# Patient Record
Sex: MALE | Race: WHITE | NOT HISPANIC OR LATINO | URBAN - METROPOLITAN AREA
[De-identification: names, ages, dates, MRNs, and addresses within clinical notes are randomized per-mention and may not be internally consistent; named-entity substitution may affect disease eponyms.]

---

## 2023-08-11 ENCOUNTER — OFFICE VISIT (OUTPATIENT)
Dept: PRIMARY CARE | Facility: CLINIC | Age: 23
End: 2023-08-11
Payer: COMMERCIAL

## 2023-08-11 VITALS — DIASTOLIC BLOOD PRESSURE: 71 MMHG | SYSTOLIC BLOOD PRESSURE: 119 MMHG | WEIGHT: 163 LBS

## 2023-08-11 DIAGNOSIS — K51.90 ULCERATIVE COLITIS WITHOUT COMPLICATIONS, UNSPECIFIED LOCATION (MULTI): ICD-10-CM

## 2023-08-11 DIAGNOSIS — Z00.00 HEALTH CARE MAINTENANCE: Primary | ICD-10-CM

## 2023-08-11 LAB
ALANINE AMINOTRANSFERASE (SGPT) (U/L) IN SER/PLAS: 36 U/L (ref 10–52)
ALBUMIN (G/DL) IN SER/PLAS: 3.5 G/DL (ref 3.4–5)
ALKALINE PHOSPHATASE (U/L) IN SER/PLAS: 156 U/L (ref 33–120)
ANION GAP IN SER/PLAS: 9 MMOL/L (ref 10–20)
ASPARTATE AMINOTRANSFERASE (SGOT) (U/L) IN SER/PLAS: 21 U/L (ref 9–39)
BILIRUBIN TOTAL (MG/DL) IN SER/PLAS: 0.3 MG/DL (ref 0–1.2)
CALCIUM (MG/DL) IN SER/PLAS: 9 MG/DL (ref 8.6–10.6)
CARBON DIOXIDE, TOTAL (MMOL/L) IN SER/PLAS: 30 MMOL/L (ref 21–32)
CHLORIDE (MMOL/L) IN SER/PLAS: 105 MMOL/L (ref 98–107)
CREATININE (MG/DL) IN SER/PLAS: 0.93 MG/DL (ref 0.5–1.3)
ERYTHROCYTE DISTRIBUTION WIDTH (RATIO) BY AUTOMATED COUNT: 17.7 % (ref 11.5–14.5)
ERYTHROCYTE MEAN CORPUSCULAR HEMOGLOBIN CONCENTRATION (G/DL) BY AUTOMATED: 28.7 G/DL (ref 32–36)
ERYTHROCYTE MEAN CORPUSCULAR VOLUME (FL) BY AUTOMATED COUNT: 75 FL (ref 80–100)
ERYTHROCYTES (10*6/UL) IN BLOOD BY AUTOMATED COUNT: 5.03 X10E12/L (ref 4.5–5.9)
GFR MALE: >90 ML/MIN/1.73M2
GLUCOSE (MG/DL) IN SER/PLAS: 74 MG/DL (ref 74–99)
HEMATOCRIT (%) IN BLOOD BY AUTOMATED COUNT: 37.6 % (ref 41–52)
HEMOGLOBIN (G/DL) IN BLOOD: 10.8 G/DL (ref 13.5–17.5)
LEUKOCYTES (10*3/UL) IN BLOOD BY AUTOMATED COUNT: 7.3 X10E9/L (ref 4.4–11.3)
NRBC (PER 100 WBCS) BY AUTOMATED COUNT: 0 /100 WBC (ref 0–0)
PLATELETS (10*3/UL) IN BLOOD AUTOMATED COUNT: 592 X10E9/L (ref 150–450)
POTASSIUM (MMOL/L) IN SER/PLAS: 4.8 MMOL/L (ref 3.5–5.3)
PROTEIN TOTAL: 6.9 G/DL (ref 6.4–8.2)
SODIUM (MMOL/L) IN SER/PLAS: 139 MMOL/L (ref 136–145)
UREA NITROGEN (MG/DL) IN SER/PLAS: 10 MG/DL (ref 6–23)

## 2023-08-11 PROCEDURE — 85027 COMPLETE CBC AUTOMATED: CPT

## 2023-08-11 PROCEDURE — 99385 PREV VISIT NEW AGE 18-39: CPT | Performed by: INTERNAL MEDICINE

## 2023-08-11 PROCEDURE — 80053 COMPREHEN METABOLIC PANEL: CPT

## 2023-08-11 NOTE — PROGRESS NOTES
Subjective   Patient ID: Renny Fang is a 22 y.o. male who presents for No chief complaint on file..    HPI CPE see updated front sheet met patient for first time father is also a patient here had longstanding ulcerative colitis versus Crohn's disease as a youth was on Humira Remicade steroids but while in college moved over to just finding a good diet for himself has not had blood in the stool in quite some time weight has been maintained overall feels well and exercises regularly no chest pain no shortness of breath no none gastrointestinal UC issues    Past medical history ulcerative colitis    Medications none    Allergies no known drug allergies    Social history no tobacco no alcohol    Family history noted and unchanged    Prevention exercises regularly no recent blood work or colonoscopy    Review of Systems    Objective   There were no vitals taken for this visit.    Physical Exam vital signs noted alert and oriented x 3 NCAT slightest of pallor no icterus or no jaundice PERRLA EOMI nares without discharge OP benign TM normal bilateral EAC clear bilateral no AC nodes no JVD or bruit no thyromegaly chest clear to auscultation CV regular rate and rhythm S1-S2 abdomen soft flat nontender normal active bowel sounds LS spine normal curvature negative straight leg raise extremities no clubbing cyanosis or edema normal distal pulses DTR 2+    Assessment/Plan    impression General medical examination ulcerative colitis  Plan skin and joints appear to be normal check Chem-7 advised on glucose potassium and kidney function check hepatic panel advised on liver enzymes check CBC advised on blood count refer to gastroenterology requisition made he may in the future be moving as he is looking for a job near the [facility] and in other [facility] good diet regular exercise good water consumption avoidance of NSAIDs recheck based on above TT 50 cc 26

## 2024-01-11 ENCOUNTER — APPOINTMENT (OUTPATIENT)
Dept: GASTROENTEROLOGY | Facility: CLINIC | Age: 24
End: 2024-01-11
Payer: COMMERCIAL

## 2024-05-13 ENCOUNTER — LAB (OUTPATIENT)
Dept: LAB | Facility: LAB | Age: 24
End: 2024-05-13
Payer: COMMERCIAL

## 2024-05-13 ENCOUNTER — OFFICE VISIT (OUTPATIENT)
Dept: PRIMARY CARE | Facility: CLINIC | Age: 24
End: 2024-05-13
Payer: COMMERCIAL

## 2024-05-13 VITALS — RESPIRATION RATE: 12 BRPM | HEART RATE: 76 BPM

## 2024-05-13 DIAGNOSIS — K51.90 ULCERATIVE COLITIS WITHOUT COMPLICATIONS, UNSPECIFIED LOCATION (MULTI): ICD-10-CM

## 2024-05-13 DIAGNOSIS — M19.079 ANKLE ARTHROPATHY: ICD-10-CM

## 2024-05-13 DIAGNOSIS — K51.90 ULCERATIVE COLITIS WITHOUT COMPLICATIONS, UNSPECIFIED LOCATION (MULTI): Primary | ICD-10-CM

## 2024-05-13 LAB
ANION GAP SERPL CALC-SCNC: 11 MMOL/L (ref 10–20)
BUN SERPL-MCNC: 7 MG/DL (ref 6–23)
CALCIUM SERPL-MCNC: 8.7 MG/DL (ref 8.6–10.6)
CHLORIDE SERPL-SCNC: 104 MMOL/L (ref 98–107)
CO2 SERPL-SCNC: 28 MMOL/L (ref 21–32)
CREAT SERPL-MCNC: 0.91 MG/DL (ref 0.5–1.3)
EGFRCR SERPLBLD CKD-EPI 2021: >90 ML/MIN/1.73M*2
ERYTHROCYTE [DISTWIDTH] IN BLOOD BY AUTOMATED COUNT: 17.3 % (ref 11.5–14.5)
ERYTHROCYTE [SEDIMENTATION RATE] IN BLOOD BY WESTERGREN METHOD: 33 MM/H (ref 0–15)
GLUCOSE SERPL-MCNC: 69 MG/DL (ref 74–99)
HCT VFR BLD AUTO: 29.5 % (ref 41–52)
HGB BLD-MCNC: 7.8 G/DL (ref 13.5–17.5)
MCH RBC QN AUTO: 17.8 PG (ref 26–34)
MCHC RBC AUTO-ENTMCNC: 26.4 G/DL (ref 32–36)
MCV RBC AUTO: 67 FL (ref 80–100)
NRBC BLD-RTO: 0 /100 WBCS (ref 0–0)
PLATELET # BLD AUTO: 699 X10*3/UL (ref 150–450)
POTASSIUM SERPL-SCNC: 5 MMOL/L (ref 3.5–5.3)
RBC # BLD AUTO: 4.39 X10*6/UL (ref 4.5–5.9)
SODIUM SERPL-SCNC: 138 MMOL/L (ref 136–145)
WBC # BLD AUTO: 5.9 X10*3/UL (ref 4.4–11.3)

## 2024-05-13 PROCEDURE — 36415 COLL VENOUS BLD VENIPUNCTURE: CPT

## 2024-05-13 PROCEDURE — 99213 OFFICE O/P EST LOW 20 MIN: CPT | Performed by: INTERNAL MEDICINE

## 2024-05-13 PROCEDURE — 80048 BASIC METABOLIC PNL TOTAL CA: CPT

## 2024-05-13 PROCEDURE — 85027 COMPLETE CBC AUTOMATED: CPT

## 2024-05-13 PROCEDURE — 85652 RBC SED RATE AUTOMATED: CPT

## 2024-05-13 RX ORDER — PREDNISONE 20 MG/1
TABLET ORAL
Qty: 15 TABLET | Refills: 0 | Status: SHIPPED | OUTPATIENT
Start: 2024-05-13

## 2024-05-13 NOTE — PROGRESS NOTES
Subjective   Patient ID: Renny Fang is a 23 y.o. male who presents for No chief complaint on file..    HPI eac     Follow-up visit and sick visit no chest pain no shortness of breath has had bilateral ankle swelling separately over the past 1 to 2 weeks no trauma injury change in diet bowels have been okay he has not had follow-up with his gastroenterologist regarding his ulcerative colitis no fever vital signs noted alert and oriented x 3 NCAT    Review of Systems    Objective   Pulse 76   Resp 12   Pallor?  No icterus no JVD chest clear to auscultation CV regular rate and rhythm S1-S2 abdomen soft nontender normal active bowel sounds extremities minimal posterior right lateral malleolus swelling with minimal pitting edema along the side of the foot there the left side is now normal there is no warmth okay range of motion  Physical Exam    Assessment/Plan    impression ulcerative colitis ankle arthropathy  Plan check ESR advised on inflammatory marker check CBC advised on blood count check Chem-7 advised on glucose potassium and kidney function follow-up with gastroenterology advised on the need for short-term steroids?  Has had no bleeding from his bowels and overall feels well except for the ankles    Refer to gastroenterology if he has not done so known call back same day (check)

## 2024-06-04 ENCOUNTER — TELEPHONE (OUTPATIENT)
Dept: GASTROENTEROLOGY | Facility: HOSPITAL | Age: 24
End: 2024-06-04
Payer: COMMERCIAL

## 2024-06-28 ENCOUNTER — OFFICE VISIT (OUTPATIENT)
Dept: PRIMARY CARE | Facility: CLINIC | Age: 24
End: 2024-06-28
Payer: COMMERCIAL

## 2024-06-28 VITALS — WEIGHT: 161 LBS | DIASTOLIC BLOOD PRESSURE: 72 MMHG | SYSTOLIC BLOOD PRESSURE: 129 MMHG

## 2024-06-28 DIAGNOSIS — K51.90 ULCERATIVE COLITIS WITHOUT COMPLICATIONS, UNSPECIFIED LOCATION (MULTI): Primary | ICD-10-CM

## 2024-06-28 DIAGNOSIS — D64.9 ANEMIA, UNSPECIFIED TYPE: ICD-10-CM

## 2024-06-28 PROCEDURE — 99213 OFFICE O/P EST LOW 20 MIN: CPT | Performed by: INTERNAL MEDICINE

## 2024-06-28 NOTE — PROGRESS NOTES
Subjective   Patient ID: Renny Fang is a 23 y.o. male who presents for No chief complaint on file..    HPI check up  Nausea having diarrhea  No chest pain or shortness of breath no blood in stool prednisone definitely helped previously prior blood work reviewed no fever  Review of Systems    Objective   There were no vitals taken for this visit.    Physical Exam vital signs noted alert and oriented x 3 NCAT pallor no JVD chest clear to auscultation CV regular rate rhythm S1-S2 abdomen soft nontender normal active bowel sounds extremities no clubbing cyanosis or edema normal distal pulses    Assessment/Plan    impression inflammatory bowel disease anemia  Plan set up for colonoscopy (req made) set up with GI (req made) discussed with diet as it relates did not want Zofran for nausea as yet follow-up based on above proceedings

## 2024-07-02 DIAGNOSIS — Z12.11 COLON CANCER SCREENING: ICD-10-CM

## 2024-07-02 RX ORDER — POLYETHYLENE GLYCOL 3350, SODIUM CHLORIDE, SODIUM BICARBONATE, POTASSIUM CHLORIDE 420; 11.2; 5.72; 1.48 G/4L; G/4L; G/4L; G/4L
POWDER, FOR SOLUTION ORAL
Qty: 4000 ML | Refills: 0 | Status: SHIPPED | OUTPATIENT
Start: 2024-07-02

## 2024-08-06 ENCOUNTER — LAB REQUISITION (OUTPATIENT)
Dept: LAB | Facility: HOSPITAL | Age: 24
End: 2024-08-06
Payer: COMMERCIAL

## 2024-08-06 ENCOUNTER — APPOINTMENT (OUTPATIENT)
Dept: GASTROENTEROLOGY | Facility: EXTERNAL LOCATION | Age: 24
End: 2024-08-06
Payer: COMMERCIAL

## 2024-08-06 DIAGNOSIS — K52.9 INFLAMMATORY BOWEL DISEASE: ICD-10-CM

## 2024-08-06 DIAGNOSIS — K51.90 ULCERATIVE COLITIS WITHOUT COMPLICATIONS, UNSPECIFIED LOCATION (MULTI): Primary | ICD-10-CM

## 2024-08-06 DIAGNOSIS — K51.90 ULCERATIVE COLITIS WITHOUT COMPLICATIONS, UNSPECIFIED LOCATION (MULTI): ICD-10-CM

## 2024-08-06 DIAGNOSIS — K50.111 CROHN'S DISEASE OF COLON WITH RECTAL BLEEDING (MULTI): Primary | ICD-10-CM

## 2024-08-06 DIAGNOSIS — D49.0: ICD-10-CM

## 2024-08-06 DIAGNOSIS — K92.1 BLOOD IN STOOL: ICD-10-CM

## 2024-08-06 DIAGNOSIS — D50.0 IRON DEFICIENCY ANEMIA DUE TO CHRONIC BLOOD LOSS: Primary | ICD-10-CM

## 2024-08-06 DIAGNOSIS — K51.40 PSEUDOPOLYPOSIS OF COLON WITHOUT COMPLICATION, UNSPECIFIED PART OF COLON (MULTI): ICD-10-CM

## 2024-08-06 DIAGNOSIS — D64.9 ANEMIA, UNSPECIFIED TYPE: ICD-10-CM

## 2024-08-06 DIAGNOSIS — K50.811 CROHN'S DISEASE OF BOTH SMALL AND LARGE INTESTINE WITH RECTAL BLEEDING (MULTI): ICD-10-CM

## 2024-08-06 DIAGNOSIS — R10.30 LOWER ABDOMINAL PAIN: ICD-10-CM

## 2024-08-06 PROCEDURE — 45380 COLONOSCOPY AND BIOPSY: CPT | Performed by: INTERNAL MEDICINE

## 2024-08-06 PROCEDURE — 88305 TISSUE EXAM BY PATHOLOGIST: CPT

## 2024-08-06 RX ORDER — PREDNISONE 10 MG/1
TABLET ORAL
Qty: 252 TABLET | Refills: 0 | Status: SHIPPED | OUTPATIENT
Start: 2024-08-06 | End: 2024-11-26

## 2024-08-07 ENCOUNTER — TELEPHONE (OUTPATIENT)
Dept: GASTROENTEROLOGY | Facility: HOSPITAL | Age: 24
End: 2024-08-07
Payer: COMMERCIAL

## 2024-08-08 ENCOUNTER — LAB (OUTPATIENT)
Dept: LAB | Facility: LAB | Age: 24
End: 2024-08-08
Payer: COMMERCIAL

## 2024-08-08 DIAGNOSIS — D50.0 ANEMIA DUE TO CHRONIC BLOOD LOSS: ICD-10-CM

## 2024-08-08 DIAGNOSIS — K51.90 ULCERATIVE COLITIS WITHOUT COMPLICATIONS, UNSPECIFIED LOCATION (MULTI): ICD-10-CM

## 2024-08-08 LAB
ALBUMIN SERPL BCP-MCNC: 3.2 G/DL (ref 3.4–5)
ALP SERPL-CCNC: 129 U/L (ref 33–120)
ALT SERPL W P-5'-P-CCNC: 12 U/L (ref 10–52)
AST SERPL W P-5'-P-CCNC: 13 U/L (ref 9–39)
BASOPHILS # BLD AUTO: 0.04 X10*3/UL (ref 0–0.1)
BASOPHILS NFR BLD AUTO: 0.5 %
BILIRUB DIRECT SERPL-MCNC: 0 MG/DL (ref 0–0.3)
BILIRUB SERPL-MCNC: 0.2 MG/DL (ref 0–1.2)
CRP SERPL-MCNC: 2.43 MG/DL
EOSINOPHIL # BLD AUTO: 0.01 X10*3/UL (ref 0–0.7)
EOSINOPHIL NFR BLD AUTO: 0.1 %
ERYTHROCYTE [DISTWIDTH] IN BLOOD BY AUTOMATED COUNT: 17.7 % (ref 11.5–14.5)
HBV CORE AB SER QL: NONREACTIVE
HBV SURFACE AB SER-ACNC: 11.4 MIU/ML
HBV SURFACE AG SERPL QL IA: NONREACTIVE
HCT VFR BLD AUTO: 30.1 % (ref 41–52)
HGB BLD-MCNC: 7.5 G/DL (ref 13.5–17.5)
IMM GRANULOCYTES # BLD AUTO: 0.03 X10*3/UL (ref 0–0.7)
IMM GRANULOCYTES NFR BLD AUTO: 0.4 % (ref 0–0.9)
LYMPHOCYTES # BLD AUTO: 1.81 X10*3/UL (ref 1.2–4.8)
LYMPHOCYTES NFR BLD AUTO: 22.9 %
MCH RBC QN AUTO: 16.9 PG (ref 26–34)
MCHC RBC AUTO-ENTMCNC: 24.9 G/DL (ref 32–36)
MCV RBC AUTO: 68 FL (ref 80–100)
MONOCYTES # BLD AUTO: 1.22 X10*3/UL (ref 0.1–1)
MONOCYTES NFR BLD AUTO: 15.4 %
NEUTROPHILS # BLD AUTO: 4.79 X10*3/UL (ref 1.2–7.7)
NEUTROPHILS NFR BLD AUTO: 60.7 %
NRBC BLD-RTO: 0 /100 WBCS (ref 0–0)
PLATELET # BLD AUTO: 838 X10*3/UL (ref 150–450)
PROT SERPL-MCNC: 6.8 G/DL (ref 6.4–8.2)
RBC # BLD AUTO: 4.44 X10*6/UL (ref 4.5–5.9)
WBC # BLD AUTO: 7.9 X10*3/UL (ref 4.4–11.3)

## 2024-08-08 PROCEDURE — 87340 HEPATITIS B SURFACE AG IA: CPT

## 2024-08-08 PROCEDURE — 84075 ASSAY ALKALINE PHOSPHATASE: CPT

## 2024-08-08 PROCEDURE — 36415 COLL VENOUS BLD VENIPUNCTURE: CPT

## 2024-08-08 PROCEDURE — 86704 HEP B CORE ANTIBODY TOTAL: CPT

## 2024-08-08 PROCEDURE — 84450 TRANSFERASE (AST) (SGOT): CPT

## 2024-08-08 PROCEDURE — 82247 BILIRUBIN TOTAL: CPT

## 2024-08-08 PROCEDURE — 84155 ASSAY OF PROTEIN SERUM: CPT

## 2024-08-08 PROCEDURE — 82728 ASSAY OF FERRITIN: CPT

## 2024-08-08 PROCEDURE — 86481 TB AG RESPONSE T-CELL SUSP: CPT

## 2024-08-08 PROCEDURE — 83540 ASSAY OF IRON: CPT

## 2024-08-08 PROCEDURE — 85025 COMPLETE CBC W/AUTO DIFF WBC: CPT

## 2024-08-08 PROCEDURE — 84460 ALANINE AMINO (ALT) (SGPT): CPT

## 2024-08-08 PROCEDURE — 83550 IRON BINDING TEST: CPT

## 2024-08-08 PROCEDURE — 86140 C-REACTIVE PROTEIN: CPT

## 2024-08-08 PROCEDURE — 82040 ASSAY OF SERUM ALBUMIN: CPT

## 2024-08-08 PROCEDURE — 86706 HEP B SURFACE ANTIBODY: CPT

## 2024-08-09 ENCOUNTER — LAB (OUTPATIENT)
Dept: LAB | Facility: LAB | Age: 24
End: 2024-08-09
Payer: COMMERCIAL

## 2024-08-09 DIAGNOSIS — K51.90 ULCERATIVE COLITIS WITHOUT COMPLICATIONS, UNSPECIFIED LOCATION (MULTI): ICD-10-CM

## 2024-08-09 DIAGNOSIS — D50.0 ANEMIA DUE TO CHRONIC BLOOD LOSS: Primary | ICD-10-CM

## 2024-08-09 LAB
FERRITIN SERPL-MCNC: 9 NG/ML (ref 20–300)
IRON SATN MFR SERPL: 3 % (ref 25–45)
IRON SERPL-MCNC: 10 UG/DL (ref 35–150)
TIBC SERPL-MCNC: 344 UG/DL (ref 240–445)
UIBC SERPL-MCNC: 334 UG/DL (ref 110–370)

## 2024-08-09 PROCEDURE — 83993 ASSAY FOR CALPROTECTIN FECAL: CPT

## 2024-08-10 LAB
NIL(NEG) CONTROL SPOT COUNT: NORMAL
PANEL A SPOT COUNT: 0
PANEL B SPOT COUNT: 0
POS CONTROL SPOT COUNT: NORMAL
T-SPOT. TB INTERPRETATION: NEGATIVE

## 2024-08-12 NOTE — PROGRESS NOTES
Gastroenterology Clinic Consult Note    Reason For Consult  IBD ileocolitis    History Of Present Illness  Renny Fang is a 23 y.o. male with a past medical history of ileocolonic IBD (previously diagnosed in 2011, now suspicious for Crohn's) and PSC who presents to GI clinic for consultation.     He was initially diagnosed with ulcerative pancolitis in 2011. He was admitted to the hospital after his index colonoscopy for IV solumedrol. He had elevated bilirubin so had liver biopsy during this admission in 2011 which was consistent with PSC.  Was started on actigall and was treated for cholangitis with IV steroids during this hospitalization as well. Previously been on Methotrexate, Sulfasalazine, Imuran (had allergic reaction). He was on Humira and Mesalamine in 2017 when he moved to [place] for college.    In [place], he reports stopping Humira because of antibodies, and was started on Remicade for about a year but felt it was ineffective ([id number]) and stopped therapy all together due to inconvenience of appointments (no car). Recently moved back to [place] to live and work and has been off all therapy for the last 5 years. He has been quite symptomatic for many years and is now living in [place] with his parents to focus on his health issues.    He had 8/6/2024 colonoscopy at [facility] by Dr. Golden which showed severe pancolitis and ileitis. He had mild narrowing in the rectum and a 15mm polypoid lesion was resected at the transverse colon. Pathology from this procedure is still pending. Fecal calprotectin is pending, CRP is mildly elevated at 2.43 and patient has iron deficiency anemia with hgb 7 and ferritin of 9. Started on prednisone 40mg once daily. Bought OTC iron supplements which he has been taking once daily 60mg     Prior to prednisone, he was having, urgency with frequency, having Bms 15-20 times per day, infrequent and scant blood in BM. Noctural stools up to 4-5x per night  some nights. Occasional accidents. Over the last year, has had waves of nausea with intense salivation a couple times a week. Abdominal discomfort that is intermittent. No skin rashes, no eye inflammation, no oral ulcers. Had some alternating left and right ankle swelling and pain over the last 4-5 months that went away when starting Prednisone. Has been more fatigued and with low energy. Has been told he has looked more pale in the last few months. Denies perianal drainage/abscess. He has lost about 10-15lbs in the last 1 year.    He is tapering prednisone by 5mg weekly, goes down to 35mg once daily tomorrow. He is only having 3-5 Bms since starting prednisone, less urgency and frequency. Feels better       Past Medical History  Past Medical History:   Diagnosis Date    Personal history of other diseases of the digestive system     Personal history of inflammatory bowel disease    Primary sclerosing cholangitis (CMS-HCC)     Primary sclerosing cholangitis       Surgical History  Past Surgical History:   Procedure Laterality Date    COLONOSCOPY  10/01/2014    Complete Colonoscopy       Social History  Social Determinants of Health     Tobacco Use: Low Risk  (8/13/2024)    Patient History     Smoking Tobacco Use: Never     Smokeless Tobacco Use: Never     Passive Exposure: Not on file   Alcohol Use: Not on file   Financial Resource Strain: Not on file   Food Insecurity: Not on file   Transportation Needs: Not on file   Physical Activity: Not on file   Stress: Not on file   Social Connections: Not on file   Intimate Partner Violence: Not on file   Depression: Not on file   Housing Stability: Not on file   Utilities: Not on file   Digital Equity: Not on file   Health Literacy: Not on file       Family History  No family history on file.   Grandmother - has ostomy, but not sure why    Allergies  No Known Allergies    Home Medications    Current Outpatient Medications:     predniSONE (Deltasone) 10 mg tablet, Take 4  "tablets (40 mg) by mouth once daily for 14 days, THEN 3.5 tablets (35 mg) once daily for 14 days, THEN 3 tablets (30 mg) once daily for 14 days, THEN 2.5 tablets (25 mg) once daily for 14 days, THEN 2 tablets (20 mg) once daily for 14 days, THEN 1.5 tablets (15 mg) once daily for 14 days, THEN 1 tablet (10 mg) once daily for 14 days, THEN 0.5 tablets (5 mg) once daily for 14 days. Take by mouth as directed., Disp: 252 tablet, Rfl: 0    polyethylene glycol-electrolytes (Nulytely) 420 gram solution, Drink 1/2 starting at 6 pm the night before your procedure then drink the 2nd 1/2 5 hours before procedure arrival time (Patient not taking: Reported on 8/13/2024), Disp: 4000 mL, Rfl: 0    Review of Systems  See HPI above     Physical Exam  General: thin, pale-appearing, no acute distress  HEENT: PERRLA, EOM intact, no scleral icterus, moist MM  Respiratory: CTA bilaterally, normal work of breathing  Cardiovascular: RRR, no murmurs/rubs/gallops  Abdomen: Soft, nontender, nondistended, bowel sounds present, no masses palpated, no organomeagly  Extremities: no edema, no asterixis  Neuro: alert and oriented, CNII-XII grossly intact, moves all 4 extremities with no focal deficits     Last Recorded Vitals  Blood pressure 121/72, pulse 81, temperature 36.5 °C (97.7 °F), height 1.778 m (5' 10\"), weight 72.1 kg (159 lb), SpO2 98%.    General: pale-appearing, no acute distress  HEENT: PERRLA, EOM intact, no scleral icterus, moist MM  Respiratory: CTA bilaterally, normal work of breathing  Cardiovascular: RRR, no murmurs/rubs/gallops  Abdomen: Soft, nontender, nondistended  Extremities: no edema, no asterixis  Neuro: alert and oriented, CNII-XII grossly intact, moves all 4 extremities with no focal deficits        Relevant Results    Current Outpatient Medications:     predniSONE (Deltasone) 10 mg tablet, Take 4 tablets (40 mg) by mouth once daily for 14 days, THEN 3.5 tablets (35 mg) once daily for 14 days, THEN 3 tablets (30 mg) " once daily for 14 days, THEN 2.5 tablets (25 mg) once daily for 14 days, THEN 2 tablets (20 mg) once daily for 14 days, THEN 1.5 tablets (15 mg) once daily for 14 days, THEN 1 tablet (10 mg) once daily for 14 days, THEN 0.5 tablets (5 mg) once daily for 14 days. Take by mouth as directed., Disp: 252 tablet, Rfl: 0    polyethylene glycol-electrolytes (Nulytely) 420 gram solution, Drink 1/2 starting at 6 pm the night before your procedure then drink the 2nd 1/2 5 hours before procedure arrival time (Patient not taking: Reported on 8/13/2024), Disp: 4000 mL, Rfl: 0     Lab Results   Component Value Date    WBC 7.9 08/08/2024    HGB 7.5 (L) 08/08/2024    HCT 30.1 (L) 08/08/2024    MCV 68 (L) 08/08/2024     (H) 08/08/2024     Lab Results   Component Value Date    GLUCOSE 69 (L) 05/13/2024    CALCIUM 8.7 05/13/2024     05/13/2024    K 5.0 05/13/2024    CO2 28 05/13/2024     05/13/2024    BUN 7 05/13/2024    CREATININE 0.91 05/13/2024     Lab Results   Component Value Date    ALT 12 08/08/2024    AST 13 08/08/2024    ALKPHOS 129 (H) 08/08/2024    BILITOT 0.2 08/08/2024       Imaging:    MRCP 2015  IMPRESSION:     1.  Redemonstration of mild biliary ductal dilatation dilatation   involving the intrahepatic biliary ducts to the subcapsular   periphery. Associated diffuse irregularity with characteristic beaded   appearance suggestive of primary sclerosing cholangitis.  The degree   of dilatation and its extent to the periphery are grossly unchanged   when compared to prior scan in 2011.  The degree of irregularity of   individual ducts in the periphery has slightly worsened in the   interval.  2.  No high grade strictures in the CBD, common hepatic and central   right and left hepatic ducts.   3.  Normal appearance of the liver and the remainder of the upper   abdomen on this scan.    Procedures:    2011 Liver biopsy    FINAL DIAGNOSIS  LIVER, NEEDLE BIOPSY:  --ACUTE DESTRUCTIVE CHOLANGITIS WITH FOCAL BILE  DUCT EFFACEMENT AND FIBROSIS  CONSISTENT WITH PRIMARY SCLEROSING CHOLANGITIS.  --SUPERIMPOSED SUPPURATIVE CHANGES IN SMALL BILE DUCTS CONSISTENT WITH  ASCENDING CHOLANGITIS.  --SINGLE FOCUS OF HISTIOCYTIC FOAM CELL ACCUMULATION CONSISTENT WITH DUCTAL  RUPTURE. SEE NOTE.      ASSESSMENT/PLAN:    #IBD ileocolitis - previously diagnosed with pan-UC but has ileitis on recent colonoscopy, pathology from which is still pending. Suspect this is likely Crohn's disease. He has been off therapy for the last 5 years and has uncontrolled disease at this time and needs advanced therapy. Has previously been on Methotrexate, Imuran (allergic reaction), Sulfasalazine, Melsalamine, and also failed both Humira (Antibodies) and Remicade (ineffective) so would recommend Rinvoq. Continue Prednisone taper in the meantime while awaiting insurance approval. In anticipation, will give 1st baron shingrix vaccine in clinic today, 2nd dose will be due within 2-6 months.    #Iron deficiency anemia -hgb 7.5, ferritin 9. Likely related to uncontrolled disease. Start iron supplementation    #Elevated alkaline phosphatase - likely related to PSC. Previously had mild intrahepatic biliary ductal dilation extending to subcapsular periphery on last MRCP in 2015. Not on any therapy. Overdue for repeat imaging, will obtain MR liver with and without    Plan:  -Continue Prednisone taper, lowering by 5mg every 7 days  -Plan to start Rinvoq 45mg once daily x3 months, then followed by 30mg once daily  -Start ferrous sulfate 325mg twice daily  -Use ppx Protonix 40mg once daily QAM for the next 2-3 months  -Obtain MRI Liver  -ShingRx vaccine (first dose) today.  Second dose will be in 2-6 months  -RTC 2 months      Shwetha Mccoy MD

## 2024-08-13 ENCOUNTER — OFFICE VISIT (OUTPATIENT)
Dept: GASTROENTEROLOGY | Facility: HOSPITAL | Age: 24
End: 2024-08-13
Payer: COMMERCIAL

## 2024-08-13 VITALS
HEIGHT: 70 IN | DIASTOLIC BLOOD PRESSURE: 72 MMHG | HEART RATE: 81 BPM | BODY MASS INDEX: 22.76 KG/M2 | TEMPERATURE: 97.7 F | SYSTOLIC BLOOD PRESSURE: 121 MMHG | OXYGEN SATURATION: 98 % | WEIGHT: 159 LBS

## 2024-08-13 DIAGNOSIS — K51.90 ULCERATIVE COLITIS WITHOUT COMPLICATIONS, UNSPECIFIED LOCATION (MULTI): ICD-10-CM

## 2024-08-13 DIAGNOSIS — K50.818 CROHN'S DISEASE OF BOTH SMALL AND LARGE INTESTINE WITH OTHER COMPLICATION (MULTI): Primary | ICD-10-CM

## 2024-08-13 DIAGNOSIS — D50.0 IRON DEFICIENCY ANEMIA DUE TO CHRONIC BLOOD LOSS: ICD-10-CM

## 2024-08-13 DIAGNOSIS — K83.01 PRIMARY SCLEROSING CHOLANGITIS (CMS-HCC): ICD-10-CM

## 2024-08-13 LAB
LABORATORY COMMENT REPORT: NORMAL
PATH REPORT.COMMENTS IMP SPEC: NORMAL
PATH REPORT.FINAL DX SPEC: NORMAL
PATH REPORT.GROSS SPEC: NORMAL
PATH REPORT.RELEVANT HX SPEC: NORMAL
PATH REPORT.TOTAL CANCER: NORMAL

## 2024-08-13 PROCEDURE — 99214 OFFICE O/P EST MOD 30 MIN: CPT | Performed by: INTERNAL MEDICINE

## 2024-08-13 PROCEDURE — 1036F TOBACCO NON-USER: CPT | Performed by: INTERNAL MEDICINE

## 2024-08-13 PROCEDURE — 90750 HZV VACC RECOMBINANT IM: CPT | Performed by: INTERNAL MEDICINE

## 2024-08-13 PROCEDURE — 3008F BODY MASS INDEX DOCD: CPT | Performed by: INTERNAL MEDICINE

## 2024-08-13 PROCEDURE — 99204 OFFICE O/P NEW MOD 45 MIN: CPT | Performed by: INTERNAL MEDICINE

## 2024-08-13 RX ORDER — FERROUS SULFATE 325(65) MG
650 TABLET, DELAYED RELEASE (ENTERIC COATED) ORAL
Qty: 60 TABLET | Refills: 11 | Status: SHIPPED | OUTPATIENT
Start: 2024-08-13 | End: 2025-08-13

## 2024-08-13 RX ORDER — PANTOPRAZOLE SODIUM 40 MG/1
40 TABLET, DELAYED RELEASE ORAL DAILY
Qty: 30 TABLET | Refills: 2 | Status: SHIPPED | OUTPATIENT
Start: 2024-08-13 | End: 2024-11-11

## 2024-08-13 ASSESSMENT — PAIN SCALES - GENERAL: PAINLEVEL: 0-NO PAIN

## 2024-08-13 NOTE — PATIENT INSTRUCTIONS
It was very nice to meet you today.  As we discussed, we need to get you on new therapy for control of your inflammatory bowel disease.  At this time, I would recommend a trial of Rinvoq and tapering of prednisone gradually off.  In addition, we need to re-evaluate your primary sclerosing cholangitis in th liver. As reviewed today:    1) taper prednisone lowering by 5 mg every 7 days until off Prednisone  2) ShingRx vaccine (first dose) today.  Second dose will be in 2-6 months  3) begin prescription oral iron sulfate 325 mg twice daily with a small amount of food  4) since you have some nausea and both prednisone and iron can worsen this, begin pantoprazole 40 mg once daily in the morning on an empty stomach for the next 2-3 months  5) we will get insurance approval for Rinvoq and start 45 mg once daily for 3 months and then lower to 30 mg daily  6) schedule MRI of the liver  7) follow-up in the office in 2 months and call if symptoms are worsening or with other questions or concerns

## 2024-08-14 LAB — CALPROTECTIN STL-MCNT: 2970 UG/G

## 2024-08-27 ENCOUNTER — APPOINTMENT (OUTPATIENT)
Dept: GASTROENTEROLOGY | Facility: HOSPITAL | Age: 24
End: 2024-08-27
Payer: COMMERCIAL

## 2024-09-05 ENCOUNTER — APPOINTMENT (OUTPATIENT)
Dept: RADIOLOGY | Facility: HOSPITAL | Age: 24
End: 2024-09-05
Payer: COMMERCIAL

## 2024-10-14 NOTE — PROGRESS NOTES
REASON FOR VISIT:  UC and PSC    HPI:  Renny Fang is a 23 y.o. male who presents for follow-up.  Last seen for flaring IBD 8/2025.   Initially diagnosed with ulcerative pancolitis and PSC in 2011. He has previously been on Methotrexate, Sulfasalazine, Imuran (had allergic reaction). Was then well on Humira and Mesalamine in 2017 when he moved to [place] for college in 2014.     In [place], stoppedHumira because of antibodies, and was started on Remicade for about a year but felt it was ineffective ([id number]) and stopped therapy all together.  Recently moved back to [place] to live and work and has been off all therapy for the last 5 years. He has been symptomatic for several years.     8/6/2024 colonoscopy at [facility] by Dr. Golden which showed severe pancolitis and ileitis. He had mild narrowing in the rectum and a 15mm polypoid lesion was resected at the transverse colon. Fecal calprotectin 2970, CRP 2.43 and iron deficiency anemia with hgb 7 and ferritin of 9. Started on prednisone 40mg once daily and OTC iron supplements once daily 60mg      When seen 8/9/24 was at prednisone 35mg once daily tomorrow and was improving with . He is only having 3-5 Bms since starting prednisone, less urgency and frequency. Starte on upadacitinib.    In follow-up today, feeling improved.  Off prednisone for 3-4 weeks.  Tolerating Rinvoq OK.  Felt great on Rinvoq and prednisone and continues to feel well off of prednisone on Rinvoq 45 mg once daily.      BM about 3 daily.  Stools are not watery, but stools not fully formed.  This is the best he has ever done with treatment, he thinks.  No nocturnal stools, sleeping through the night and this is a big change.  Some acne on back, but getting better.  He also started running again and noted some joint pain in knees and left hip.  Unclear if related to Rinvoq.  He has not noticed these issues before with his IBD.    No oral ulcers.  No eye inflammation.    Eating well.   Weight up 14 lbs.    REVIEW OF SYSTEMS  Complete review of systems otherwise negative per complaint    No Known Allergies    Past Medical History:   Diagnosis Date    Personal history of other diseases of the digestive system     Personal history of inflammatory bowel disease    Primary sclerosing cholangitis (CMS-HCC)     Primary sclerosing cholangitis       Past Surgical History:   Procedure Laterality Date    COLONOSCOPY  10/01/2014    Complete Colonoscopy       Current Outpatient Medications   Medication Sig Dispense Refill    ferrous sulfate 325 (65 Fe) MG EC tablet Take 2 tablets by mouth once daily with breakfast. Do not crush, chew, or split. 60 tablet 11    pantoprazole (ProtoNix) 40 mg EC tablet Take 1 tablet (40 mg) by mouth once daily. Do not crush, chew, or split. 30 tablet 2    predniSONE (Deltasone) 10 mg tablet Take 4 tablets (40 mg) by mouth once daily for 14 days, THEN 3.5 tablets (35 mg) once daily for 14 days, THEN 3 tablets (30 mg) once daily for 14 days, THEN 2.5 tablets (25 mg) once daily for 14 days, THEN 2 tablets (20 mg) once daily for 14 days, THEN 1.5 tablets (15 mg) once daily for 14 days, THEN 1 tablet (10 mg) once daily for 14 days, THEN 0.5 tablets (5 mg) once daily for 14 days. Take by mouth as directed. 252 tablet 0    upadacitinib ER (Rinvoq) 30 mg tablet extended release 24 hr Take 1 tablet (30 mg) by mouth once daily. 90 tablet 3    upadacitinib ER (Rinvoq) 45 mg tablet extended release 24 hr Take 1 tablet (45 mg) by mouth once daily. CaseId:[id number];Status:Approved;Review Type:Prior Auth;Coverage Start Date:09/26/2023;Coverage End Date:04/23/2024; 84 tablet 0     No current facility-administered medications for this visit.       PHYSICAL EXAM:  /60   Pulse 76   Temp 36 °C (96.8 °F)   Resp 16   Wt 78.7 kg (173 lb 6.4 oz)   BMI 24.88 kg/m²   Most recent office note reviewed including patient history and indication for procedure.  Patient seen and examined.  Medications  and allergies reviewed.  Vital signs reviewed  Patient alert and oriented in no acute distress  Anicteric  No cervical adenopathy  Skin mild healing acne on upper back with small comedones  Cardiac exam regular rate and rhythm S1-S2 without murmurs gallops or rubs  Lungs clear to auscultation bilaterally  Abdomen soft and nontender without organomegaly or mass.  No rebound or guarding.  Bowel sounds present  Extremities without edema      Lab Results   Component Value Date    WBC 7.9 08/08/2024    HGB 7.5 (L) 08/08/2024    HCT 30.1 (L) 08/08/2024    MCV 68 (L) 08/08/2024     (H) 08/08/2024     Lab Results   Component Value Date    ALT 12 08/08/2024    AST 13 08/08/2024    ALKPHOS 129 (H) 08/08/2024    BILITOT 0.2 08/08/2024       ASSESSMENT  # Ulcerative colitis-he has had an excellent response to upadacitinib and is tolerating medication without difficulty.  Unclear if the minor joint discomfort is related to therapy or, perhaps more likely, to weaning off of prednisone.  We will monitor this going forward.  He will complete induction therapy with written Ct and then drop to 30 mg daily.  I told him to let us know if symptoms change with dose reduction.    We will go ahead and check labs.  Will have him follow-up with us in 3 months    # PSC-he is unable to afford the MRI of the liver at this time.  We will go ahead and check labs including an alpha-fetoprotein and also a right upper quadrant ultrasound.  If there is anything on the ultrasound, we will proceed to the MRI scan.  Will refer him to hepatology for evaluation and follow-up after his next visit in 3 months once we are certain that UC is controlled.    #Health maintenance-second Shingrix vaccine today    PLAN  1) check labs  2) check right upper quadrant ultrasound  3) second Shingrix vaccine today  4) continue upadacitinib transitioning from 45 to 30 mg daily  5) continue iron once daily; Dr. Campoverde will let you know the lab results and whether or  not this needs to be continued  6) follow-up in the office in 3 months

## 2024-10-15 ENCOUNTER — OFFICE VISIT (OUTPATIENT)
Dept: GASTROENTEROLOGY | Facility: HOSPITAL | Age: 24
End: 2024-10-15
Payer: COMMERCIAL

## 2024-10-15 VITALS
SYSTOLIC BLOOD PRESSURE: 106 MMHG | HEART RATE: 76 BPM | TEMPERATURE: 96.8 F | DIASTOLIC BLOOD PRESSURE: 60 MMHG | RESPIRATION RATE: 16 BRPM | WEIGHT: 173.4 LBS | BODY MASS INDEX: 24.88 KG/M2

## 2024-10-15 DIAGNOSIS — K83.01 PRIMARY SCLEROSING CHOLANGITIS (CMS-HCC): ICD-10-CM

## 2024-10-15 DIAGNOSIS — K51.90 ULCERATIVE COLITIS WITHOUT COMPLICATIONS, UNSPECIFIED LOCATION (MULTI): Primary | ICD-10-CM

## 2024-10-15 PROCEDURE — 99214 OFFICE O/P EST MOD 30 MIN: CPT | Performed by: INTERNAL MEDICINE

## 2024-10-15 PROCEDURE — 90750 HZV VACC RECOMBINANT IM: CPT | Performed by: INTERNAL MEDICINE

## 2024-10-15 ASSESSMENT — PAIN SCALES - GENERAL: PAINLEVEL: 0-NO PAIN

## 2024-10-15 NOTE — PATIENT INSTRUCTIONS
Is great that you are feeling better on Rinvoq.  Hopefully, symptoms will continue to improve over time on therapy.  As we discussed today:    Plan:  1) check labs  2) check right upper quadrant ultrasound  3) second Shingrix vaccine today  4) continue upadacitinib transitioning from 45 to 30 mg daily  5) continue iron once daily; Dr. Campoverde will let you know the lab results and whether or not this needs to be continued  6) follow-up in the office in 3 months

## 2024-12-13 ENCOUNTER — TELEPHONE (OUTPATIENT)
Dept: GASTROENTEROLOGY | Facility: HOSPITAL | Age: 24
End: 2024-12-13
Payer: COMMERCIAL

## 2025-07-10 DIAGNOSIS — K50.818 CROHN'S DISEASE OF BOTH SMALL AND LARGE INTESTINE WITH OTHER COMPLICATION: ICD-10-CM

## 2025-07-11 ENCOUNTER — TELEPHONE (OUTPATIENT)
Dept: GASTROENTEROLOGY | Facility: HOSPITAL | Age: 25
End: 2025-07-11
Payer: COMMERCIAL

## 2025-07-18 ENCOUNTER — TELEPHONE (OUTPATIENT)
Dept: GASTROENTEROLOGY | Facility: HOSPITAL | Age: 25
End: 2025-07-18
Payer: COMMERCIAL

## 2025-08-12 ENCOUNTER — APPOINTMENT (OUTPATIENT)
Dept: GASTROENTEROLOGY | Facility: HOSPITAL | Age: 25
End: 2025-08-12
Payer: COMMERCIAL

## 2025-08-12 VITALS
RESPIRATION RATE: 18 BRPM | OXYGEN SATURATION: 98 % | BODY MASS INDEX: 24.25 KG/M2 | WEIGHT: 169 LBS | SYSTOLIC BLOOD PRESSURE: 118 MMHG | TEMPERATURE: 97.6 F | HEART RATE: 60 BPM | DIASTOLIC BLOOD PRESSURE: 76 MMHG

## 2025-08-12 DIAGNOSIS — K83.01 PRIMARY SCLEROSING CHOLANGITIS: ICD-10-CM

## 2025-08-12 DIAGNOSIS — K51.90 ULCERATIVE COLITIS WITHOUT COMPLICATIONS, UNSPECIFIED LOCATION (MULTI): Primary | ICD-10-CM

## 2025-08-12 PROCEDURE — 99214 OFFICE O/P EST MOD 30 MIN: CPT | Performed by: INTERNAL MEDICINE

## 2025-08-12 PROCEDURE — 99212 OFFICE O/P EST SF 10 MIN: CPT | Performed by: INTERNAL MEDICINE

## 2025-08-12 ASSESSMENT — PAIN SCALES - GENERAL: PAINLEVEL_OUTOF10: 0-NO PAIN

## 2025-08-23 LAB
ALBUMIN SERPL-MCNC: 4.4 G/DL (ref 3.6–5.1)
ALP SERPL-CCNC: 99 U/L (ref 36–130)
ALT SERPL-CCNC: 45 U/L (ref 9–46)
ANION GAP SERPL CALCULATED.4IONS-SCNC: 8 MMOL/L (CALC) (ref 7–17)
AST SERPL-CCNC: 41 U/L (ref 10–40)
BASOPHILS # BLD AUTO: 39 CELLS/UL (ref 0–200)
BASOPHILS NFR BLD AUTO: 0.8 %
BILIRUB SERPL-MCNC: 0.3 MG/DL (ref 0.2–1.2)
BUN SERPL-MCNC: 16 MG/DL (ref 7–25)
CALCIUM SERPL-MCNC: 9.5 MG/DL (ref 8.6–10.3)
CALPROTECTIN STL-MCNT: NORMAL UG/G
CHLORIDE SERPL-SCNC: 103 MMOL/L (ref 98–110)
CO2 SERPL-SCNC: 26 MMOL/L (ref 20–32)
CREAT SERPL-MCNC: 0.98 MG/DL (ref 0.6–1.24)
CRP SERPL-MCNC: <3 MG/L
EGFRCR SERPLBLD CKD-EPI 2021: 109 ML/MIN/1.73M2
EOSINOPHIL # BLD AUTO: 113 CELLS/UL (ref 15–500)
EOSINOPHIL NFR BLD AUTO: 2.3 %
ERYTHROCYTE [DISTWIDTH] IN BLOOD BY AUTOMATED COUNT: 16.7 % (ref 11–15)
GLUCOSE SERPL-MCNC: 77 MG/DL (ref 65–99)
HCT VFR BLD AUTO: 41.7 % (ref 38.5–50)
HGB BLD-MCNC: 12.2 G/DL (ref 13.2–17.1)
IGNF NEG CNTRL BLD: NORMAL
LYMPHOCYTES # BLD AUTO: 2161 CELLS/UL (ref 850–3900)
LYMPHOCYTES NFR BLD AUTO: 44.1 %
M TB IFN-G BLD-IMP: NEGATIVE
MCH RBC QN AUTO: 24.3 PG (ref 27–33)
MCHC RBC AUTO-ENTMCNC: 29.3 G/DL (ref 32–36)
MCV RBC AUTO: 82.9 FL (ref 80–100)
MITOGEN IGNF.SPOT COUNT BLD: NORMAL
MONOCYTES # BLD AUTO: 544 CELLS/UL (ref 200–950)
MONOCYTES NFR BLD AUTO: 11.1 %
NEUTROPHILS # BLD AUTO: 2043 CELLS/UL (ref 1500–7800)
NEUTROPHILS NFR BLD AUTO: 41.7 %
PLATELET # BLD AUTO: 373 THOUSAND/UL (ref 140–400)
PMV BLD REES-ECKER: 9.9 FL (ref 7.5–12.5)
POTASSIUM SERPL-SCNC: 4.3 MMOL/L (ref 3.5–5.3)
PROT SERPL-MCNC: 7.5 G/DL (ref 6.1–8.1)
QUEST PANEL A SPOT COUNT: 2
QUEST PANEL B SPOT COUNT: 1
RBC # BLD AUTO: 5.03 MILLION/UL (ref 4.2–5.8)
SODIUM SERPL-SCNC: 137 MMOL/L (ref 135–146)
WBC # BLD AUTO: 4.9 THOUSAND/UL (ref 3.8–10.8)

## 2025-08-27 LAB
ALBUMIN SERPL-MCNC: 4.4 G/DL (ref 3.6–5.1)
ALP SERPL-CCNC: 99 U/L (ref 36–130)
ALT SERPL-CCNC: 45 U/L (ref 9–46)
ANION GAP SERPL CALCULATED.4IONS-SCNC: 8 MMOL/L (CALC) (ref 7–17)
AST SERPL-CCNC: 41 U/L (ref 10–40)
BASOPHILS # BLD AUTO: 39 CELLS/UL (ref 0–200)
BASOPHILS NFR BLD AUTO: 0.8 %
BILIRUB SERPL-MCNC: 0.3 MG/DL (ref 0.2–1.2)
BUN SERPL-MCNC: 16 MG/DL (ref 7–25)
CALCIUM SERPL-MCNC: 9.5 MG/DL (ref 8.6–10.3)
CALPROTECTIN STL-MCNT: 577 MCG/G
CHLORIDE SERPL-SCNC: 103 MMOL/L (ref 98–110)
CO2 SERPL-SCNC: 26 MMOL/L (ref 20–32)
CREAT SERPL-MCNC: 0.98 MG/DL (ref 0.6–1.24)
CRP SERPL-MCNC: <3 MG/L
EGFRCR SERPLBLD CKD-EPI 2021: 109 ML/MIN/1.73M2
EOSINOPHIL # BLD AUTO: 113 CELLS/UL (ref 15–500)
EOSINOPHIL NFR BLD AUTO: 2.3 %
ERYTHROCYTE [DISTWIDTH] IN BLOOD BY AUTOMATED COUNT: 16.7 % (ref 11–15)
GLUCOSE SERPL-MCNC: 77 MG/DL (ref 65–99)
HCT VFR BLD AUTO: 41.7 % (ref 38.5–50)
HGB BLD-MCNC: 12.2 G/DL (ref 13.2–17.1)
IGNF NEG CNTRL BLD: NORMAL
LYMPHOCYTES # BLD AUTO: 2161 CELLS/UL (ref 850–3900)
LYMPHOCYTES NFR BLD AUTO: 44.1 %
M TB IFN-G BLD-IMP: NEGATIVE
MCH RBC QN AUTO: 24.3 PG (ref 27–33)
MCHC RBC AUTO-ENTMCNC: 29.3 G/DL (ref 32–36)
MCV RBC AUTO: 82.9 FL (ref 80–100)
MITOGEN IGNF.SPOT COUNT BLD: NORMAL
MONOCYTES # BLD AUTO: 544 CELLS/UL (ref 200–950)
MONOCYTES NFR BLD AUTO: 11.1 %
NEUTROPHILS # BLD AUTO: 2043 CELLS/UL (ref 1500–7800)
NEUTROPHILS NFR BLD AUTO: 41.7 %
PLATELET # BLD AUTO: 373 THOUSAND/UL (ref 140–400)
PMV BLD REES-ECKER: 9.9 FL (ref 7.5–12.5)
POTASSIUM SERPL-SCNC: 4.3 MMOL/L (ref 3.5–5.3)
PROT SERPL-MCNC: 7.5 G/DL (ref 6.1–8.1)
QUEST PANEL A SPOT COUNT: 2
QUEST PANEL B SPOT COUNT: 1
RBC # BLD AUTO: 5.03 MILLION/UL (ref 4.2–5.8)
SODIUM SERPL-SCNC: 137 MMOL/L (ref 135–146)
WBC # BLD AUTO: 4.9 THOUSAND/UL (ref 3.8–10.8)